# Patient Record
Sex: MALE | Race: WHITE
[De-identification: names, ages, dates, MRNs, and addresses within clinical notes are randomized per-mention and may not be internally consistent; named-entity substitution may affect disease eponyms.]

---

## 2018-04-16 ENCOUNTER — HOSPITAL ENCOUNTER (OUTPATIENT)
Dept: HOSPITAL 45 - C.RAD | Age: 78
Discharge: HOME | End: 2018-04-16
Attending: UROLOGY
Payer: COMMERCIAL

## 2018-04-16 DIAGNOSIS — N20.0: ICD-10-CM

## 2018-04-16 DIAGNOSIS — N40.1: Primary | ICD-10-CM

## 2018-04-16 LAB
BUN SERPL-MCNC: 24 MG/DL (ref 7–18)
CREAT SERPL-MCNC: 1.08 MG/DL (ref 0.6–1.4)

## 2018-04-16 NOTE — DIAGNOSTIC IMAGING REPORT
KUB



CLINICAL HISTORY: N40.1 Benign prostatic hypertrophy with urinary

[id number] pain



COMPARISON STUDY:  2/6/2017 



FINDINGS: Nonobstructive bowel pattern. Degenerative change of the lumbar spine

and bony pelvis. No significant nephrocalcinosis. Several pelvic vascular

calcifications. Moderate degenerative change of the hips bilaterally.



IMPRESSION:  

1. Nonobstructive bowel pattern.

2. Moderate degenerative change of the osseous structures. 













The above report was generated using voice recognition software.  It may contain

grammatical, syntax or spelling errors.







Electronically signed by:  Abdirizak Hayden M.D.

4/16/2018 12:10 PM



Dictated Date/Time:  4/16/2018 12:09 PM